# Patient Record
Sex: MALE | Race: BLACK OR AFRICAN AMERICAN | NOT HISPANIC OR LATINO | Employment: STUDENT | URBAN - METROPOLITAN AREA
[De-identification: names, ages, dates, MRNs, and addresses within clinical notes are randomized per-mention and may not be internally consistent; named-entity substitution may affect disease eponyms.]

---
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
## 2024-05-23 ENCOUNTER — OFFICE VISIT (OUTPATIENT)
Dept: SLEEP MEDICINE | Facility: CLINIC | Age: 19
End: 2024-05-23
Payer: COMMERCIAL

## 2024-05-23 VITALS — WEIGHT: 152.13 LBS | BODY MASS INDEX: 24.45 KG/M2 | HEIGHT: 66 IN

## 2024-05-23 DIAGNOSIS — R06.83 SNORING: ICD-10-CM

## 2024-05-23 DIAGNOSIS — G47.09 OTHER INSOMNIA: Primary | ICD-10-CM

## 2024-05-23 PROCEDURE — 99203 OFFICE O/P NEW LOW 30 MIN: CPT | Mod: S$GLB,,, | Performed by: INTERNAL MEDICINE

## 2024-05-23 PROCEDURE — 1159F MED LIST DOCD IN RCRD: CPT | Mod: CPTII,S$GLB,, | Performed by: INTERNAL MEDICINE

## 2024-05-23 PROCEDURE — 3008F BODY MASS INDEX DOCD: CPT | Mod: CPTII,S$GLB,, | Performed by: INTERNAL MEDICINE

## 2024-05-23 PROCEDURE — 99999 PR PBB SHADOW E&M-EST. PATIENT-LVL III: CPT | Mod: PBBFAC,,, | Performed by: INTERNAL MEDICINE

## 2024-05-23 NOTE — PROGRESS NOTES
"Referred by Enrico Gill III, MD     NEW PATIENT VISIT    Wenceslao Heller II  is a pleasant 18 y.o. male who presents in the spring of 2024 for sleep evaluation    See assessment below for further history.    Past Medical History:   Diagnosis Date    ADHD     Concussion     2019     Exercise-induced asthma     Mood disorder      Patient Active Problem List   Diagnosis    Effusion of right knee    Right knee pain    Weakness    Exercise-induced asthma    Mood disorder    Attention deficit hyperactivity disorder (ADHD)    Chronic pain of right ankle    Balance disorder    Decreased range of motion of right ankle    Lower extremity weakness    ROD (generalized anxiety disorder)    Substance use disorder    Low TSH level       Current Outpatient Medications:     albuterol (PROVENTIL/VENTOLIN HFA) 90 mcg/actuation inhaler, Inhale 1 puff into the lungs every 4 (four) hours as needed for Wheezing. Rescue, Disp: 18 g, Rfl: 0    ARIPiprazole (ABILIFY) 2 MG Tab, Take 2 tablets (4 mg total) by mouth once daily., Disp: 60 tablet, Rfl: 2    clobetasoL (TEMOVATE) 0.05 % cream, Apply topically 2 (two) times daily. Prn rash or itch.Stop using steroid topical when skin is smooth and non itchy.  Do not treat dark or red coloring., Disp: 60 g, Rfl: 0    methylphenidate HCl (RITALIN LA) 10 MG 24 hr capsule, Take 1 capsule (10 mg total) by mouth every morning., Disp: 30 capsule, Rfl: 0    sertraline (ZOLOFT) 100 MG tablet, Take 1 tablet (100 mg total) by mouth once daily., Disp: 30 tablet, Rfl: 2       Vitals:    05/23/24 1302   Weight: 69 kg (152 lb 1.9 oz)   Height: 5' 6" (1.676 m)     Physical Exam:    GEN:   Well-appearing  Psych:  Appropriate affect, demonstrates insight  SKIN:  No rash on the face or bridge of the nose      LABS:   No results found for: "HGB", "CO2"      RECORDS REVIEWED:        ASSESSMENT      PROBLEM DESCRIPTION/ Sx on Presentation  STATUS PLAN     screening CHILO       some snoring as a child, no witnessed  " apneas  Had tonsillectomy as a child    Feels tired during the day         New   -we discussed sleep testing to evaluate for CHILO though he has few risk factors              Daytime Sx     no sleepiness when inactive, hard to nap  ESS 6/24 on intake        N/A   N/A     Insomnia     Presentation:   Trouble falling asleep and staying asleep for a long time, since elementary school  Worse over time  Also having realistic dreams  Has bipolar disorder, managed by psychiatry    SLEEP SCHEDULE   Duration    Wind- down    Envmnt    CBTi    Meds prior MLT   Meds now Nyquil, benadryl   Seroquel    Bed Time 11p-3AM (no set time)   Lights out    Latency 3+ hrs   Arousals 2.5   Back to sleep 30min to  hours   Stim. ctrl    Wake time 10-11AM  (11A-1p)   Caffeine    Naps some   Nocturia    Work         Trouble falling asleep    Waking frequently             New   -we discussed CHRONIC INSOMNIA  RECOMMENDATIONS:  WIND-DOWN: try to establish a wind-down routine   PHONE: turn off notifications and keep phone out of the bedroom  CAFFEINE: avoid caffeine within 12-16 hours of bed time  ALCOHOL: avoid alcohol in the evening     -LIMIT TIB: limit time in bed to no more than 8 hours   -STIMULUS CONTROL: avoid being in bed and frustrated  -WAKE TIME: keep a consistent wake time even on days off  -BED TIME: establish a regular bed time      -consider formal Cognitive Behavior Therapy for insomnia (CBTi) though would be unable to do sleep restritction with his history of bipolar disorder    We discussed CBTi but he says that he has been told this and tried it but doesn't work.      Significant PMH: Bipolar disorder, inattentive ADD (adderall 5mg, taking rarely)  Other issues:     RTC:  will arrange RTC depending on results of sleep testing

## 2024-09-23 ENCOUNTER — TELEPHONE (OUTPATIENT)
Dept: FAMILY MEDICINE | Facility: CLINIC | Age: 19
End: 2024-09-23
Payer: COMMERCIAL

## 2024-09-23 NOTE — TELEPHONE ENCOUNTER
Called patient to reschedule appointment with Dr Gill due to him being out. Patient can see Dr Jairo Maldonado. No answer. Left voicemail

## 2024-10-09 ENCOUNTER — PATIENT MESSAGE (OUTPATIENT)
Dept: INTERNAL MEDICINE | Facility: CLINIC | Age: 19
End: 2024-10-09
Payer: COMMERCIAL

## 2025-02-25 ENCOUNTER — OFFICE VISIT (OUTPATIENT)
Dept: PRIMARY CARE CLINIC | Facility: CLINIC | Age: 20
End: 2025-02-25
Payer: COMMERCIAL

## 2025-02-25 ENCOUNTER — TELEPHONE (OUTPATIENT)
Dept: PRIMARY CARE CLINIC | Facility: CLINIC | Age: 20
End: 2025-02-25

## 2025-02-25 VITALS
DIASTOLIC BLOOD PRESSURE: 83 MMHG | SYSTOLIC BLOOD PRESSURE: 128 MMHG | OXYGEN SATURATION: 98 % | HEART RATE: 85 BPM | TEMPERATURE: 98 F | HEIGHT: 66 IN | BODY MASS INDEX: 27.02 KG/M2 | WEIGHT: 168.13 LBS

## 2025-02-25 DIAGNOSIS — R61 NIGHT SWEATS: ICD-10-CM

## 2025-02-25 DIAGNOSIS — J30.9 ALLERGIC RHINITIS, UNSPECIFIED SEASONALITY, UNSPECIFIED TRIGGER: Primary | ICD-10-CM

## 2025-02-25 DIAGNOSIS — G25.81 RESTLESS LEGS: ICD-10-CM

## 2025-02-25 DIAGNOSIS — F51.04 CHRONIC INSOMNIA: ICD-10-CM

## 2025-02-25 DIAGNOSIS — Z00.00 ROUTINE HEALTH MAINTENANCE: ICD-10-CM

## 2025-02-25 DIAGNOSIS — R79.89 LOW TSH LEVEL: ICD-10-CM

## 2025-02-25 DIAGNOSIS — Z11.3 SCREENING EXAMINATION FOR STD (SEXUALLY TRANSMITTED DISEASE): ICD-10-CM

## 2025-02-25 PROCEDURE — 1159F MED LIST DOCD IN RCRD: CPT | Mod: CPTII,S$GLB,, | Performed by: STUDENT IN AN ORGANIZED HEALTH CARE EDUCATION/TRAINING PROGRAM

## 2025-02-25 PROCEDURE — 3074F SYST BP LT 130 MM HG: CPT | Mod: CPTII,S$GLB,, | Performed by: STUDENT IN AN ORGANIZED HEALTH CARE EDUCATION/TRAINING PROGRAM

## 2025-02-25 PROCEDURE — 3008F BODY MASS INDEX DOCD: CPT | Mod: CPTII,S$GLB,, | Performed by: STUDENT IN AN ORGANIZED HEALTH CARE EDUCATION/TRAINING PROGRAM

## 2025-02-25 PROCEDURE — 99999 PR PBB SHADOW E&M-EST. PATIENT-LVL IV: CPT | Mod: PBBFAC,,, | Performed by: STUDENT IN AN ORGANIZED HEALTH CARE EDUCATION/TRAINING PROGRAM

## 2025-02-25 PROCEDURE — 3079F DIAST BP 80-89 MM HG: CPT | Mod: CPTII,S$GLB,, | Performed by: STUDENT IN AN ORGANIZED HEALTH CARE EDUCATION/TRAINING PROGRAM

## 2025-02-25 PROCEDURE — 1160F RVW MEDS BY RX/DR IN RCRD: CPT | Mod: CPTII,S$GLB,, | Performed by: STUDENT IN AN ORGANIZED HEALTH CARE EDUCATION/TRAINING PROGRAM

## 2025-02-25 PROCEDURE — 99204 OFFICE O/P NEW MOD 45 MIN: CPT | Mod: S$GLB,,, | Performed by: STUDENT IN AN ORGANIZED HEALTH CARE EDUCATION/TRAINING PROGRAM

## 2025-02-25 RX ORDER — MIRTAZAPINE 15 MG/1
15 TABLET, FILM COATED ORAL NIGHTLY
COMMUNITY
Start: 2025-01-17 | End: 2025-02-25

## 2025-02-25 RX ORDER — FLUTICASONE PROPIONATE 50 MCG
2 SPRAY, SUSPENSION (ML) NASAL 2 TIMES DAILY
Qty: 16 G | Refills: 3 | Status: SHIPPED | OUTPATIENT
Start: 2025-02-25

## 2025-02-25 RX ORDER — TACROLIMUS 1 MG/G
OINTMENT TOPICAL
COMMUNITY

## 2025-02-25 RX ORDER — ZOLPIDEM TARTRATE 10 MG/1
10 TABLET ORAL NIGHTLY PRN
COMMUNITY
Start: 2025-02-13

## 2025-02-25 RX ORDER — LIFITEGRAST 50 MG/ML
SOLUTION/ DROPS OPHTHALMIC 2 TIMES DAILY
COMMUNITY

## 2025-02-25 RX ORDER — ARIPIPRAZOLE 5 MG/1
5 TABLET ORAL NIGHTLY
COMMUNITY

## 2025-02-25 RX ORDER — ESCITALOPRAM OXALATE 10 MG/1
10 TABLET ORAL
COMMUNITY
Start: 2025-02-13

## 2025-02-25 RX ORDER — DESOXIMETASONE 2.5 MG/G
OINTMENT TOPICAL 2 TIMES DAILY PRN
COMMUNITY

## 2025-02-25 RX ORDER — DEXTROAMPHETAMINE SACCHARATE, AMPHETAMINE ASPARTATE, DEXTROAMPHETAMINE SULFATE AND AMPHETAMINE SULFATE 2.5; 2.5; 2.5; 2.5 MG/1; MG/1; MG/1; MG/1
1 TABLET ORAL EVERY MORNING
COMMUNITY
Start: 2025-02-14

## 2025-02-25 RX ORDER — DUPILUMAB 300 MG/2ML
INJECTION, SOLUTION SUBCUTANEOUS
COMMUNITY
Start: 2024-06-10

## 2025-02-25 NOTE — TELEPHONE ENCOUNTER
----- Message from Tala Gaming MD sent at 2/25/2025 11:02 AM CST -----  Regarding: insomnia  Please let pt know that after finishing chart review there are other sleep aids he has not tried before that could be an option.Because ambien is a controlled substance some are the options can't be prescribed til 3/13. If he would like to look into quviviq, dayvigo or lunesta please let us know. Lunesta is similar to ambien but is more helpful with staying asleep vs falling asleep and the first 2 work in a totally different area of the brain for causing drowsiness.

## 2025-02-25 NOTE — PROGRESS NOTES
02/25/2025    Wenceslao Heller   [id number]    Chief Complaint   Patient presents with    \[facility]\"" Care     Previous history of thyroid issues.     Insomnia     Pt have not been able to fall asleep or stay asleep. Been going on for years but gotten worse over the last 6 months.     Night Sweats     Pt would wake up drenched in sweat. Sweats easily        HPI    History of Present Illness    CHIEF COMPLAINT:  Wenceslao presents today for sleep issues.    Chronic conditions: atopic dermatitis, insomnia, adhd and mood disorder    SLEEP DISORDER:  He obtains only 3-4 hours of sleep on good nights and has difficulty staying still in bed, describing a constant need to move around, though distinct from restless leg syndrome. He experiences night sweats for approximately one month, waking up drenched in sweat particularly in the upper body, requiring daily sheet washing.    He has attempted numerous non-pharmacological interventions without success, including phone restriction, dark room environment, various lighting conditions, music, podcasts, and white noise. Environmental sleep aids include multiple fans and an air purifier.    MEDICATIONS:  Current sleep regimen includes Ambien with Doxylamine (Unisom), which helps with sleep initiation but not maintenance. He recently started magnesium glycinate and Lexapro. Previous sleep medication trials include Remeron, Clonidine, and Trazodone, all of which were ineffective. Seroquel caused agitation and daytime drowsiness without improving sleep.    DIET:  He primarily consumes chicken and turkey for protein, avoids red meat, and has recently reduced sugar intake.      ROS:  General: -fever, -chills, -fatigue, -weight gain, -weight loss  Eyes: -vision changes, -redness, -discharge  ENT: -ear pain, -nasal congestion, -sore throat  Cardiovascular: -chest pain, -palpitations, -lower extremity edema  Respiratory: -cough, -shortness of breath  Gastrointestinal: -abdominal pain,  -nausea, -vomiting, -diarrhea, -constipation, -blood in stool  Genitourinary: -dysuria, -hematuria, -frequency  Musculoskeletal: -joint pain, -muscle pain  Skin: -rash, -lesion  Neurological: -headache, -dizziness, -numbness, -tingling  Psychiatric: -anxiety, -depression, +sleep difficulty  Endocrine: +excessive sweating             Negative 10 point ROS outside of HPI    Social History     Socioeconomic History    Marital status: Single   Occupational History    Occupation: student    Tobacco Use    Smoking status: Every Day     Types: Vaping with nicotine    Smokeless tobacco: Never   Social History Narrative    Patient is originally from         Emergency C/S 2 weeks past - meconium aspiration     Able to go home     No hearing screening,      No antibiotics         No hospital visit         Normal development         No growth , problems ,        Mother first period 16,     Father- lives in [place]                    School at         College/university         Working         ///Single            Children        Lives with         Diet/Exericse             Current Medications[1]      Physical Exam  Vitals:    25 0953   BP: 128/83   Pulse: 85   Temp: 98.3 °F (36.8 °C)       Physical Exam      Gen: well appearing, appears restless  Resp: non labored breathing, no crackles, no wheezes, CTAB  CV: RRR no murmur, gallops, rubs, no LE edema  Abd: soft nontender BS present no organomegaly      Problem List Items Addressed This Visit       Low TSH level    Overview   Stu Ontiveros MD Hill, Norman V. III, MD  Looks like it came back normal. I would just check TSH yearly unless he develops hyperthyroid symptoms. No treatment needed right now    FINDINGS:  The 24 hour uptake is upper limit of normal at 28.8 % (normal 10-30%).     Homogeneous distribution of radionuclide throughout both lobes of thyroid noting upper limit of normal for size.  No evidence of hyper or hypofunctioning  nodule.     Impression:     1. Upper limit of normal 24 hour radioiodine uptake by the thyroid.  2. Normal scintigraphic appearance of the thyroid.     Electronically signed by resident: Hima Hays  Date:                                            11/17/2023          Other Visit Diagnoses         Allergic rhinitis, unspecified seasonality, unspecified trigger    -  Primary      Night sweats          Screening examination for STD (sexually transmitted disease)          Restless legs          Routine health maintenance          Chronic insomnia                1. Allergic rhinitis, unspecified seasonality, unspecified trigger  - fluticasone propionate (FLONASE) 50 mcg/actuation nasal spray; 2 sprays (100 mcg total) by Each Nostril route 2 (two) times a day.  Dispense: 16 g; Refill: 3    2. Low TSH level  Hx of borderline NM uptake;recommended annual labs per endocrinology  - Thyroid Peroxidase Antibody; Future  - TSH; Future  - T4, Free; Future  - Anti-Thyroglobulin Antibody; Future    3. Night sweats  New since Lexapro  - CBC Auto Differential; Future  - Comprehensive Metabolic Panel; Future  - QUANTIFERON GOLD TB; Future    4. Screening examination for STD (sexually transmitted disease)  - HIV 1/2 Ag/Ab (4th Gen); Future  - Treponema Pallidium Antibodies IgG, IgM; Future  - C. trachomatis/N. gonorrhoeae by AMP DNA; Future  - Trichomonas vaginalis, RNA, Qual, Urine; Future    5. Restless legs  - CBC Auto Differential; Future  - Iron and TIBC; Future  - Ferritin; Future  - Vitamin B12; Future    6. Routine health maintenance  - Hemoglobin A1C; Future  - Lipid Panel; Future    7. Chronic insomnia  - Home Sleep Study; Future  -currently on ambien 10 mg and doxylamine  -hx of trazodone, clonidine, remeron, seroquel w/o improvement  -hx of sleep medicine and was recommended CBTi; patient has not been able to access      RTC in pending work up    Tala Gaming MD  Family Medicine           [1]   Current Outpatient  Medications:     clobetasoL (TEMOVATE) 0.05 % cream, Apply topically 2 (two) times daily. Prn rash or itch.Stop using steroid topical when skin is smooth and non itchy.  Do not treat dark or red coloring., Disp: 60 g, Rfl: 0    dextroamphetamine-amphetamine 10 mg Tab, Take 1 tablet by mouth every morning., Disp: , Rfl:     DUPIXENT  mg/2 mL PnIj, , Disp: , Rfl:     EScitalopram oxalate (LEXAPRO) 10 MG tablet, Take 10 mg by mouth., Disp: , Rfl:     XIIDRA 5 % Dpet, Place into both eyes 2 (two) times daily., Disp: , Rfl:     zolpidem (AMBIEN) 10 mg Tab, Take 10 mg by mouth nightly as needed., Disp: , Rfl:     albuterol (PROVENTIL/VENTOLIN HFA) 90 mcg/actuation inhaler, Inhale 1 puff into the lungs every 4 (four) hours as needed for Wheezing. Rescue (Patient not taking: Reported on 2/25/2025), Disp: 18 g, Rfl: 0    ARIPiprazole (ABILIFY) 5 MG Tab, Take 5 mg by mouth every evening., Disp: , Rfl:     desoximetasone 0.25 % ointment, Apply topically 2 (two) times daily as needed., Disp: , Rfl:     fluticasone propionate (FLONASE) 50 mcg/actuation nasal spray, 2 sprays (100 mcg total) by Each Nostril route 2 (two) times a day., Disp: 16 g, Rfl: 3    tacrolimus (PROTOPIC) 0.1 % ointment, Apply topically., Disp: , Rfl:

## 2025-02-26 ENCOUNTER — PATIENT OUTREACH (OUTPATIENT)
Dept: ADMINISTRATIVE | Facility: HOSPITAL | Age: 20
End: 2025-02-26
Payer: COMMERCIAL

## 2025-03-06 ENCOUNTER — HOSPITAL ENCOUNTER (OUTPATIENT)
Dept: SLEEP MEDICINE | Facility: OTHER | Age: 20
Discharge: HOME | End: 2025-03-06
Attending: STUDENT IN AN ORGANIZED HEALTH CARE EDUCATION/TRAINING PROGRAM
Payer: COMMERCIAL

## 2025-03-06 DIAGNOSIS — F51.04 CHRONIC INSOMNIA: ICD-10-CM

## 2025-03-06 PROCEDURE — 95800 SLP STDY UNATTENDED: CPT

## 2025-03-07 ENCOUNTER — RESULTS FOLLOW-UP (OUTPATIENT)
Dept: PRIMARY CARE CLINIC | Facility: CLINIC | Age: 20
End: 2025-03-07
Payer: COMMERCIAL

## 2025-03-07 ENCOUNTER — E-CONSULT (OUTPATIENT)
Dept: ENDOCRINOLOGY | Facility: CLINIC | Age: 20
End: 2025-03-07
Payer: COMMERCIAL

## 2025-03-07 DIAGNOSIS — R79.89 LOW T4: ICD-10-CM

## 2025-03-07 DIAGNOSIS — E61.1 IRON DEFICIENCY: Primary | ICD-10-CM

## 2025-03-07 DIAGNOSIS — G47.33 OSA (OBSTRUCTIVE SLEEP APNEA): ICD-10-CM

## 2025-03-07 DIAGNOSIS — R79.89 LOW TSH LEVEL: Primary | ICD-10-CM

## 2025-03-07 PROBLEM — F51.04 CHRONIC INSOMNIA: Status: ACTIVE | Noted: 2025-03-07

## 2025-03-07 RX ORDER — FERROUS SULFATE 324(65)MG
324 TABLET, DELAYED RELEASE (ENTERIC COATED) ORAL DAILY
Qty: 30 TABLET | Refills: 3 | Status: SHIPPED | OUTPATIENT
Start: 2025-03-07

## 2025-03-07 NOTE — CONSULTS
[facility]  Response for E-Consult     Patient Name: Wenceslao Heller II  MRN: [id number]  Primary Care Provider: Tala Gaming MD   Requesting Provider: Tala Gaming MD  E-Consult to Endocrinology  Consult performed by: Jaime Gonsales DO  Consult ordered by: Tala Gaming MD  Reason for consult: Thyroid Concerns  Assessment/Recommendations: See note      I have reviewed the chart, labs and prior imaging for the patient.  On latest testing the TSH has showed improvement.  Given the TSH is now normal this suggests that the thyroid issues from before with suppressed levels could have been a transient change even with the uptake scan showing uptake at higher end of normal.     Latest Reference Range & Units 06/30/23 09:07 09/19/23 15:24 03/06/25 10:10   TSH 0.400 - 4.000 uIU/mL 0.41 (L) 0.321 (L) 0.943   Free T4 0.71 - 1.51 ng/dL  0.77 0.55 (L)   Thyroglobulin Ab Screen 0.0 - 3.9 IU/mL  <4.0 <4.0   Thyroperoxidase Antibodies <6.0 IU/mL  <6.0 <6.0   (L): Data is abnormally low    The T4 levels being mildly low in isolation is more likely a transient change as well so focus should be put more on the TSH.  I agree that the TSH level can be monitored yearly for now or sooner if a pattern of symptoms develops which is concerning for hyperthyroidism.  If TSH again suppressed in the future with levels approaching 0.1 or less then a referral to endocrine would be suggested at that time.  If TSH only mildly decreased then labs can be watched at first unless symptoms become a concern.  First line therapy for symptoms of hyperthyroid in that setting would be beta blocker therapy such as Atenolol 25 mg daily.     Option to repeat the TSH within the next month to ensure the single isolated T4 was not an early sign of issues that would later affect the Free T4.  Would only need Free T4 as a reflex order to TSH though.  Having normal antibody levels is encouraging that there is unlikely to be an  autoimmune thyroid condition present.    Reach out if questions.    Total time of Consultation: 10 minute    I did not speak to the requesting provider verbally about this.     *This eConsult is based on the clinical data available to me and is furnished without benefit of a physical examination. The eConsult will need to be interpreted in light of any clinical issues or changes in patient status not available to me at the time of filing this eConsults. Significant changes in patient condition or level of acuity should result in immediate formal consultation and reevaluation. Please alert me if you have further questions.    Thank you for this eConsult referral.     DO [facility]

## 2025-03-11 NOTE — PROCEDURES
"HST completed and interpreted.    Report is available under media tab (NOTE: It is located in the bottom half of the media time in the section labeled "unknown date")  Consider referral to Sleep Medicine if a formal consult might be of benefit.  Please feel free to contact me if you would like me to expedite a referral or if you have any questions.  "

## 2025-03-12 ENCOUNTER — TELEPHONE (OUTPATIENT)
Dept: PRIMARY CARE CLINIC | Facility: CLINIC | Age: 20
End: 2025-03-12
Payer: COMMERCIAL

## 2025-03-12 NOTE — TELEPHONE ENCOUNTER
Patient's mother is requesting a referral for ENT   No-Patient/Caregiver offered and refused free interpretation services.

## 2025-03-12 NOTE — TELEPHONE ENCOUNTER
Patient's mother  advised in detail of physician's recommendation. Patient's mother verbalized an understanding of those recommendations.